# Patient Record
(demographics unavailable — no encounter records)

---

## 2024-10-30 NOTE — ASSESSMENT
[FreeTextEntry1] : 1- allow to void 2- NESS - evla for hydro and check left punctate stone seen on CT  ( 2020) 3- rtc for UDS ( brochure given )

## 2024-10-30 NOTE — HISTORY OF PRESENT ILLNESS
[FreeTextEntry1] : May 21, 2019 ( Dr Yusef Patterson) Rachael Toney is a 57 y/o female patient presents today for an initial evaluation. Her primary language spoken language is [place]. Her daughter is here today to translate for her. Catheters were placed after she complained of urinary retention. The urinary retention became an issue after a procedure on her stomach was completed. When the pressure to urinate was experienced pain was also experienced. Additionally before the catheter was inserted she reported burning during urination. An antibiotic was provided.  CX: +  E coli   Now here for eval of the same - AUR  oct 24, 2024: ua and cx negative  bladder scan of 560 ml after which CORREA placed  CT SCAN ( oct 28, 2020) images reviewed KIDNEYS/URETERS: Possible punctate nephrolith in the lower pole right kidney. No perinephric edema. No hydronephrosis or hydroureter BLADDER: Compressed by Correa catheter REPRODUCTIVE ORGANS: Hysterectomy.  hx of freqeuny to void for past 6 months , nocutira 4-5x , diff to void as well wiht strain to void takes 20min to empty bladder  no constipation  avg water jntake  denies any change in diet , meds or travel n past 6 m or c/o LE numbness or weakness or blurry vision  DM and HTN for 8 years  c/o lower abd pain and thus chris to ER -

## 2024-10-30 NOTE — PHYSICAL EXAM
[Normal Appearance] : normal appearance [Well Groomed] : well groomed [General Appearance - In No Acute Distress] : no acute distress [Edema] : no peripheral edema [Respiration, Rhythm And Depth] : normal respiratory rhythm and effort [Exaggerated Use Of Accessory Muscles For Inspiration] : no accessory muscle use [Abdomen Soft] : soft [Abdomen Tenderness] : non-tender [Normal Station and Gait] : the gait and station were normal for the patient's age [] : no rash [No Focal Deficits] : no focal deficits [Oriented To Time, Place, And Person] : oriented to person, place, and time [Affect] : the affect was normal [Mood] : the mood was normal [No Palpable Adenopathy] : no palpable adenopathy [de-identified] : siddiqi filled with 150 ml sterile water wiht urge felt to void and siddiqi removed intact [Chaperone Present] : A chaperone was present in the examining room during all aspects of the physical examination [FreeTextEntry2] : BETH Aaron